# Patient Record
Sex: MALE | Race: WHITE | NOT HISPANIC OR LATINO | Employment: FULL TIME | URBAN - METROPOLITAN AREA
[De-identification: names, ages, dates, MRNs, and addresses within clinical notes are randomized per-mention and may not be internally consistent; named-entity substitution may affect disease eponyms.]

---

## 2018-07-10 ENCOUNTER — OFFICE VISIT (OUTPATIENT)
Dept: OTOLARYNGOLOGY | Facility: CLINIC | Age: 58
End: 2018-07-10
Payer: COMMERCIAL

## 2018-07-10 VITALS — HEIGHT: 74 IN | BODY MASS INDEX: 27.53 KG/M2 | WEIGHT: 214.5 LBS

## 2018-07-10 DIAGNOSIS — K21.9 LARYNGOPHARYNGEAL REFLUX (LPR): Primary | ICD-10-CM

## 2018-07-10 PROCEDURE — 99999 PR PBB SHADOW E&M-EST. PATIENT-LVL III: CPT | Mod: PBBFAC,,, | Performed by: OTOLARYNGOLOGY

## 2018-07-10 PROCEDURE — 3008F BODY MASS INDEX DOCD: CPT | Mod: CPTII,S$GLB,, | Performed by: OTOLARYNGOLOGY

## 2018-07-10 PROCEDURE — 99203 OFFICE O/P NEW LOW 30 MIN: CPT | Mod: S$GLB,,, | Performed by: OTOLARYNGOLOGY

## 2018-07-10 RX ORDER — OMEPRAZOLE 40 MG/1
40 CAPSULE, DELAYED RELEASE ORAL DAILY
Qty: 30 CAPSULE | Refills: 11 | Status: SHIPPED | OUTPATIENT
Start: 2018-07-10 | End: 2018-08-23 | Stop reason: SDUPTHER

## 2018-07-10 RX ORDER — ALLOPURINOL 100 MG/1
100 TABLET ORAL DAILY
COMMUNITY

## 2018-07-10 NOTE — PROGRESS NOTES
Subjective:       Patient ID: Dao Jones is a 57 y.o. male.    Chief Complaint: feels like food getting stuck in throat; Sore Throat; and trouble singing    Dao is here for globus. Symptoms have been present for > 1 year and maybe longer. 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 Laying off of the singing was a little better. He has globus, no worse at particular time. Occasional heartburn - no regular medications. He had heartburn recently following food poisoning and this made the throat worse. No voice changes. No otalgia, no coughing up blood. No cough.     Meds: allopurinol  He had emergency abdominal surgery for a possible perforation.   Previous surgery: no head or neck surgery    History   Smoking Status    Never Smoker   Smokeless Tobacco    Not on file     History   Alcohol use Not on file          Review of Systems   Constitutional: Negative for activity change and appetite change.   Eyes: Negative for discharge.   Respiratory: Negative for difficulty breathing and wheezing   Cardiovascular: Negative for chest pain.   Gastrointestinal: Negative for abdominal distention and abdominal pain.   Endocrine: Negative for cold intolerance and heat intolerance.   Genitourinary: Negative for dysuria.   Musculoskeletal: Negative for gait problem and joint swelling.   Skin: Negative for color change and pallor.   Neurological: Negative for syncope and weakness.   Psychiatric/Behavioral: Negative for agitation and confusion.     Objective:        Constitutional:   He is oriented to person, place, and time. He appears well-developed and well-nourished. He appears alert. He is active. Normal speech.      Head:  Normocephalic and atraumatic. Head is without TMJ tenderness. No scars. Salivary glands normal.  Facial strength is normal.      Ears:    Right Ear: No drainage or swelling. No middle ear effusion.   Left Ear: No drainage or swelling.  No middle ear effusion.     Nose:  No mucosal edema,  rhinorrhea or sinus tenderness. No turbinate hypertrophy.      Mouth/Throat  Oropharynx clear and moist without lesions or asymmetry, normal uvula midline and mirror exam normal. Normal dentition. No uvula swelling, lacerations or trismus. No oropharyngeal exudate. Tonsillar erythema, tonsillar exudate.    Cords mobile,  Mild thickened mucous on larynx  Mod PCE, interarytenoid pachydermia      Neck:  Full range of motion with neck supple and no adenopathy. Thyroid tenderness is present. No tracheal deviation, no edema, no erythema, normal range of motion, no stridor, no crepitus and no neck rigidity present. No thyroid mass present.     Cardiovascular:   Intact distal pulses and normal pulses.      Pulmonary/Chest:   Effort normal and breath sounds normal. No stridor.     Psychiatric:   His speech is normal and behavior is normal. His mood appears not anxious. His affect is not labile.     Neurological:   He is alert and oriented to person, place, and time. No sensory deficit.     Skin:   No abrasions, lacerations, lesions, or rashes. No abrasion and no bruising noted.         Tests / Results:  None    Assessment:       1. Laryngopharyngeal reflux (LPR)          Plan:         Omeprazole 40 mg daily  Diet changes  Improve hydration  Follow-up 3 months, NP scope if not improvement

## 2018-07-10 NOTE — PATIENT INSTRUCTIONS
"LARYNGOPHARYNGEAL REFLUX  (SILENT OR ATYPICAL REFLUX)    If you have any of the following symptoms you may have laryngopharyngeal reflux (LPR):  hoarseness, thick or too much mucus, chronic throat pain/irritation, chronic throat clearing, chronic cough, especially cough that wake you up from sleep, chronic "postnasal drip" without the need to blow your nose.     Many people with LPR do not have symptoms of heartburn. Compared to the esophagus, the voice box and the back of the throat are significantly more sensitive to the effects of acid and digestive enzymes on surrounding tissue. Acid passing quickly through the esophagus does not have a chance to irritate the area for too long.  However acid that pools in the throat or voice box can cause prolonged irritation resulting in the symptoms of LPR.    The symptoms of LPR can consist of a dry cough and the sensation of something being stuck in the throat.  Some people will complain of heartburn while others may have intermittent hoarseness or loss of voice.  Another major symptom of LPR is "postnasal drip."  Patients are often told symptoms are due to abnormal nasal drainage or sinus infection; however this is rarely the cause of chronic throat irritation. For post nasal drip to cause the complaints described, signs and symptoms of an active nasal infection should be present.     Treatments for LPR include: postural changes (sleeping or laying with an incline), weight reduction, diet modification, medication to reduce stomach acid and promote normal motility, and rarely surgery to prevent reflux. Most patients will begin to notice some relief in her symptoms about 2-4 weeks after starting the medication; however it is generally recommended the medication should be continued for at least 2 months. If the symptoms completely resolve, the medication can then be tapered.  Some people will remain symptom free while others may have relapses which required treatment " again.    Things you may be able to do to prevent reflux.  1. Do not smoke.  Smoking will cause reflux.    2. Avoid tight fitting clothes or belts around the waist.    3. Avoid eating at least 2-3 hours prior to bedtime.  Try to avoid very large meals at night.    4. Weight loss.  For patient's with recent weight gain, shedding a few pounds is all that is required to improve reflux.    5. Avoid reflux triggers. These can worsen acid in the stomach or even cause the esophagus muscles to relax: caffeine, soft drinks, acidic foods (tomato, lots of fruit) mints, alcoholic beverages, particularly at night, cheese, fried foods, spicy foods, eggs, and chocolate.    6. Sleep with the head of bed elevated at least 6 inches.  7. You may also be prescribed a medication, or a medication you take may also be increased. Dr. Metzger will discuss this with you.

## 2018-07-12 ENCOUNTER — TELEPHONE (OUTPATIENT)
Dept: OTOLARYNGOLOGY | Facility: CLINIC | Age: 58
End: 2018-07-12

## 2018-07-12 NOTE — TELEPHONE ENCOUNTER
----- Message from Tyler Garcia sent at 7/12/2018  3:57 PM CDT -----  Contact: Patient  Dao, [phone]. Calling in regards to prescription for omeprazole (PRILOSEC) 40 MG capsule. Says it's not covered under his insurance and would like to see about getting script transferred to a different pharmacy. Would like to have it sent to [facility]. Please advise. Thanks.    [facility]  [address] [place] [address]  Phone [phone]

## 2018-08-23 RX ORDER — OMEPRAZOLE 40 MG/1
40 CAPSULE, DELAYED RELEASE ORAL DAILY
Qty: 90 CAPSULE | Refills: 3 | Status: SHIPPED | OUTPATIENT
Start: 2018-08-23 | End: 2019-08-23

## 2018-08-23 NOTE — TELEPHONE ENCOUNTER
----- Message from Vangie Mcconnell sent at 8/23/2018  3:46 PM CDT -----  Contact: patient  Patient requesting call back regarding his medication. Please call [phone]

## 2018-11-01 ENCOUNTER — OFFICE VISIT (OUTPATIENT)
Dept: OTOLARYNGOLOGY | Facility: CLINIC | Age: 58
End: 2018-11-01
Payer: COMMERCIAL

## 2018-11-01 VITALS — BODY MASS INDEX: 27.71 KG/M2 | HEIGHT: 75 IN | WEIGHT: 222.88 LBS

## 2018-11-01 DIAGNOSIS — R68.2 DRY MOUTH: ICD-10-CM

## 2018-11-01 DIAGNOSIS — K21.9 LARYNGOPHARYNGEAL REFLUX (LPR): Primary | ICD-10-CM

## 2018-11-01 PROCEDURE — 31575 DIAGNOSTIC LARYNGOSCOPY: CPT | Mod: S$GLB,,, | Performed by: OTOLARYNGOLOGY

## 2018-11-01 PROCEDURE — 99214 OFFICE O/P EST MOD 30 MIN: CPT | Mod: 25,S$GLB,, | Performed by: OTOLARYNGOLOGY

## 2018-11-01 PROCEDURE — 3008F BODY MASS INDEX DOCD: CPT | Mod: CPTII,S$GLB,, | Performed by: OTOLARYNGOLOGY

## 2018-11-01 PROCEDURE — 99999 PR PBB SHADOW E&M-EST. PATIENT-LVL III: CPT | Mod: PBBFAC,,, | Performed by: OTOLARYNGOLOGY

## 2018-11-01 NOTE — PROGRESS NOTES
Subjective:       Patient ID: Dao Jones is a 58 y.o. male.    Chief Complaint: feels pressure in throat and trouble swallowing    Dao is here for follow-up of LPR.   He still feels pressure sensation in his neck. He has some mild pill dysphagia. He has issues with dry foods more than anything, but denies any specific food sticking. The medication has seemed to help this, but it has helped more reflux.  He is still having issues with throat irritation during a performance and has vocal fatigue.    He does take a daily oral AH. Unsure if allergy issues are existing. No otalgia.     Review of Systems   Constitutional: Negative for activity change and appetite change.   Respiratory: Negative for difficulty breathing and wheezing   Cardiovascular: Negative for chest pain.      Objective:        Constitutional:   He is oriented to person, place, and time. He appears well-developed and well-nourished. He appears alert. He is active. Normal speech.      Head:  Normocephalic and atraumatic. Head is without TMJ tenderness. No scars. Salivary glands normal.  Facial strength is normal.      Ears:    Right Ear: No drainage or swelling. No middle ear effusion.   Left Ear: No drainage or swelling.  No middle ear effusion.     Nose:  No mucosal edema, rhinorrhea or sinus tenderness. No turbinate hypertrophy.      Mouth/Throat  Oropharynx clear and moist without lesions or asymmetry, normal uvula midline and mirror exam normal. Normal dentition. No uvula swelling, lacerations or trismus. No oropharyngeal exudate. Tonsillar erythema, tonsillar exudate.      Neck:  Full range of motion with neck supple and no adenopathy. Thyroid tenderness is present. No tracheal deviation, no edema, no erythema, normal range of motion, no stridor, no crepitus and no neck rigidity present. No thyroid mass present.     Cardiovascular:   Intact distal pulses and normal pulses.      Pulmonary/Chest:   Effort normal and breath sounds normal. No  stridor.     Psychiatric:   His speech is normal and behavior is normal. His mood appears not anxious. His affect is not labile.     Neurological:   He is alert and oriented to person, place, and time. No sensory deficit.     Skin:   No abrasions, lacerations, lesions, or rashes. No abrasion and no bruising noted.         Tests / Results:  Pre-procedure diagnosis: The primary encounter diagnosis was Laryngopharyngeal reflux (LPR). A diagnosis of Dry mouth was also pertinent to this visit.     Post-procedure diagnosis: same    Procedure: Flexible fiberoptic laryngoscopy    Surgeon: Sang Metzger MD    Anesthesia: 2% Lidocaine with 4% Oxymetazoline    Risks, benefits, and alternatives of the procedure were discussed with the patient, and the patient consented to the fiberoptic examination.  We applied a topical nasal decongestant and analgesic.  After adequate anesthesia was obtained, the flexible fiberoptic scope was passed through the nasal cavity. The entire pharynx (nasopharynx to hypopharynx) and the larynx were visualized. At the end of the examination, the scope was removed. The patient tolerated the procedure well with no complications.     Findings:  -     Laryngeal mucosa is dry with mildly increase secretions in hypopharynx  -     Post-cricoid region: moderate PCe  -     Lingual tonsils have mild hypertrophy  -     Adenoids have no  hypertrophy  -     Right vocal fold: normal mobility     mass/lesion: none  -     Left vocal fold: normal mobility     mass/lesion: none  -     Other findings: thickened mucous over petiole and subglottis      Assessment:       1. Laryngopharyngeal reflux (LPR)    2. Dry mouth          Plan:       Reassured no lesions / abnormalities seen on scope other than  Thickened mucous seen on exam. Could be contributing to his persistent globus symptoms and worsening vocal fatigue and causing mild MTD/muscle tension dysphagia.  Discussed hydration, vocal rest when needed, stop oral  AH  Continue PPI for now  Can consider sialogogues only if still persistent and bothersome  Given mild nature to dysphagia and previous normal EGD, defer any additional esophageal workup for now.   Fu 3 months

## 2018-11-01 NOTE — PATIENT INSTRUCTIONS
Stop the Claritin     Mucous Management    Several factors can cause the sensation of increased mucous in the throat, including dryness, acid reflux, and increased mucous production from allergies or chronic sinus drainage. There is also some evidence that added sugars or processed sugars in the diet (not the kind that occur naturally in honey or ripe fruit) can increase mucus, as well as too much dairy. To avoid these refined carbohydrates, on food labels, watch out for wheat flour (also called white, refined or enriched flour) on the ingredients list. The below website has several good options for mucous management.    [email or web address]    Some recommendations:    -Water, water, water  -Cut back on caffeine  -Steam treatments during the day (Personal Steam Inhalers are available at most pharmacies and drugstores. [employer] sells one called Bugcrowd's steam inhaler that people like. A cheaper alternative is a bowl of warm water with a towel over your head. You can breathe in the steam for a couple of minutes, especially if you are about to use your voice a lot, or when you are feeling particularly dry in your throat or mouth.)  -Humidifier at night. Put this right next to your head so that the mist falls on your face.  -Prevention of acid reflux by avoiding late-night eating (nothing 3 hours before laying down at night), greasy and spicy foods, and acidic foods  -Mucinex (over-the- counter, generic name guaifenesin. Buy the kind without a cough  suppressant or decongestant added). This medicine doesn't work well if you are not well  hydrated, so drink plenty of water on days when you take it.  -Nasal saline irrigations with NeilMed rinse kit, Neti Pot, Active Sinus, or Nasopure irrigation bottles (available in any pharmacy or grocery store, and all available on amazon)  -Avoid the things you are allergic to if you have allergies (use dust mite covers on your bed, wash your  "hair at night instead of morning if you have pollen allergies)  -Wilcox's Breezers (sugar-free), Grether's black currant pastilles, and Entertainer's Secret Throat Relief can all help dry mouth and thickened mucous. See website above for details.  -If you smoke, stop.  -Low- sugar diet. The FDA is changing the way they label foods soon so that it's easier to tell when sugars occur naturally in the food (which is not harmful to our health) vs when processed sugars or syrups have been added to the food (which may increase inflammation in the body).  In the meantime, you can google the "Prifloat diet" to get an idea of foods that you might want to avoid.  -Dairy - some patients find that eating a lot of dairy products worsens their mucus.  -Sleep apnea - if you have sleep apnea and don';t treat it, consider starting up your CPAP again (and be sure to use the humidification). Snoring and struggling to keep your airway open all night long is traumatizing to the lining of your throat and can increase irritation.    "

## 2019-08-21 RX ORDER — OMEPRAZOLE 40 MG/1
CAPSULE, DELAYED RELEASE ORAL
Qty: 90 CAPSULE | Refills: 2 | OUTPATIENT
Start: 2019-08-21